# Patient Record
Sex: FEMALE | Race: WHITE
[De-identification: names, ages, dates, MRNs, and addresses within clinical notes are randomized per-mention and may not be internally consistent; named-entity substitution may affect disease eponyms.]

---

## 2020-01-21 ENCOUNTER — HOSPITAL ENCOUNTER (EMERGENCY)
Dept: HOSPITAL 62 - ER | Age: 35
Discharge: HOME | End: 2020-01-21
Payer: COMMERCIAL

## 2020-01-21 VITALS — DIASTOLIC BLOOD PRESSURE: 87 MMHG | SYSTOLIC BLOOD PRESSURE: 127 MMHG

## 2020-01-21 DIAGNOSIS — R07.81: ICD-10-CM

## 2020-01-21 DIAGNOSIS — R07.89: Primary | ICD-10-CM

## 2020-01-21 DIAGNOSIS — R06.02: ICD-10-CM

## 2020-01-21 PROCEDURE — 99283 EMERGENCY DEPT VISIT LOW MDM: CPT

## 2020-01-21 PROCEDURE — 71101 X-RAY EXAM UNILAT RIBS/CHEST: CPT

## 2020-01-21 NOTE — RADIOLOGY REPORT (SQ)
EXAM DESCRIPTION:  RIBS LEFT W/PA CHEST



COMPLETED DATE/TIME:  1/21/2020 2:01 pm



REASON FOR STUDY:  pain to left ribs anterior close to sternum



COMPARISON:  None.



TECHNIQUE:  Frontal view of the chest and additional views of the left ribs acquired.



NUMBER OF VIEWS:  3



LIMITATIONS:  None.



FINDINGS:  FRONTAL CXR: No pneumothorax.  No pleural effusion.  No atelectasis or infiltrates.

RIBS: No displaced rib fractures.  No lytic or blastic bony lesions.

OTHER: No other significant finding.



IMPRESSION:  NO PNEUMOTHORAX.  NO DISPLACED RIB FRACTURES.



COMMENT:  SITE OF TRAUMA/COMPLAINT MARKED/STAMP COMPLETED: NA



TECHNICAL DOCUMENTATION:  JOB ID:  [id number]

 2011 [employer]- All Rights Reserved



Reading location - IP/workstation name: [id number]

## 2020-01-21 NOTE — ER DOCUMENT REPORT
ED General





- General


Chief Complaint: Rib Pain


Stated Complaint: RIB PAIN


Time Seen by Provider: 01/21/20 14:33


Primary Care Provider: 


COURTNEY TAPIA MD [Primary Care Provider] - Follow up in 3-5 days


Mode of Arrival: Ambulatory


Information source: Patient


Notes: 





34-year-old female presents to ED for complaint of left rib pain that started on

Saturday night.  Friday night she had done a stretch with her  

where she reached across and it felt like a rib popped or moved.  She states the

pain is been there since then.  She states the pain is much worse if she tries 

to lay down reach around her or move.  She states Tylenol Motrin and heat help a

little bit but do not relieve the pain.  She states she has been short of breath

and she feels like she needs to clear her throat frequently.


TRAVEL OUTSIDE OF THE [place] IN LAST 30 DAYS: No





- HPI


Onset: Last week


Onset/Duration: Sudden


Quality of pain: Sharp


Severity: Moderate


Pain Level: 3


Associated symptoms: Chest pain - Left chest wall pain


Exacerbated by: Supine, Walking, Coughing, Deep breathing


Relieved by: Denies


Similar symptoms previously: No


Recently seen / treated by doctor: No





Past Medical History





- General


Information source: Patient





- Social History


Smoking Status: Never Smoker


Cigarette use (# per day): No


Chew tobacco use (# tins/day): No


Smoking Education Provided: No


Frequency of alcohol use: None


Drug Abuse: None


Occupation: RN


Lives with: Family


Family History: Reviewed & Not Pertinent


Patient has suicidal ideation: No


Patient has homicidal ideation: No





- Past Medical History


Cardiac Medical History: Reports: None


Pulmonary Medical History: Reports: None


EENT Medical History: Reports: None


Neurological Medical History: Reports: None


Endocrine Medical History: Reports: None


Renal/ Medical History: Reports: Hx Kidney Stones


Malignancy Medical History: Reports: None


GI Medical History: Reports: None


Musculoskeletal Medical History: Reports None


Skin Medical History: Reports None


Psychiatric Medical History: Reports: None


Traumatic Medical History: Reports: None


Infectious Medical History: Reports: None


Past Surgical History: Reports: Hx Appendectomy, Hx Kidney (Renal Surgery) - 

Stents





- Immunizations


Immunizations up to date: Yes


Hx Diphtheria, Pertussis, Tetanus Vaccination: Yes





Review of Systems





- Review of Systems


Constitutional: No symptoms reported


EENT: No symptoms reported


Cardiovascular: Chest pain - Left chest wall pain


Respiratory: No symptoms reported


Gastrointestinal: No symptoms reported


Genitourinary: No symptoms reported


Female Genitourinary: No symptoms reported


Musculoskeletal: Muscle pain - Left chest wall


Skin: No symptoms reported


Hematologic/Lymphatic: No symptoms reported


Neurological/Psychological: No symptoms reported


-: Yes All other systems reviewed and negative





Physical Exam





- Vital signs


Vitals: 


                                        











Temp Pulse Resp BP Pulse Ox


 


 98.2 F   70   20   131/93 H  100 


 


 01/21/20 14:22  01/21/20 14:22  01/21/20 14:22  01/21/20 14:22  01/21/20 14:22











Interpretation: Normal





- General


General appearance: Appears well, Alert





- HEENT


Head: Normocephalic, Atraumatic


Eyes: Normal


Pupils: PERRL





- Respiratory


Respiratory status: No respiratory distress


Chest status: Tender, Pain on movement, Pain with cough, Pain with deep 

breathing


Breath sounds: Normal


Chest palpation: Normal





- Cardiovascular


Rhythm: Regular


Heart sounds: Normal auscultation


Murmur: No





- Abdominal


Inspection: Normal


Distension: No distension


Bowel sounds: Normal


Tenderness: Nontender


Organomegaly: No organomegaly





- Back


Back: Normal, Nontender





- Extremities


General upper extremity: Normal inspection, Nontender, Normal color, Normal ROM,

Normal temperature


General lower extremity: Normal inspection, Nontender, Normal color, Normal ROM,

Normal temperature, Normal weight bearing.  No: Bhavik's sign





- Neurological


Neuro grossly intact: Yes


Cognition: Normal


Orientation: AAOx4


Marshal Coma Scale Eye Opening: Spontaneous


[place] Coma Scale Verbal: Oriented


[place] Coma Scale Motor: Obeys Commands


Marshal Coma Scale Total: 15


Speech: Normal


Motor strength normal: LUE, RUE, LLE, RLE


Sensory: Normal





- Psychological


Associated symptoms: Normal affect, Normal mood





- Skin


Skin Temperature: Warm


Skin Moisture: Dry


Skin Color: Normal





Course





- Re-evaluation


Re-evalutation: 





01/21/20 18:54


X-ray results were discussed with patient.  There was no fractures no 

pneumothorax no crepitus on exam or on x-ray.  Patient was given a written 

report of the x-ray and discharged home after she verbalized understanding and 

agreement with treatment plan.





- Vital Signs


Vital signs: 


                                        











Temp Pulse Resp BP Pulse Ox


 


 97.9 F   68   18   127/87 H  98 


 


 01/21/20 15:27  01/21/20 15:27  01/21/20 15:27  01/21/20 15:27  01/21/20 15:27














- Diagnostic Test


Radiology reviewed: Image reviewed, Reports reviewed





Discharge





- Discharge


Clinical Impression: 


 Chest wall tenderness





Condition: Stable


Disposition: HOME, SELF-CARE


Additional Instructions: 


CHEST WALL PAIN:


     Your chest pain may be coming from the chest wall. This is often caused by 

straining the muscles or joints in the chest during physical activity, direct 

trauma, coughing, or vigorous vomiting.  Persons with arthritis are especially 

prone to this type of pain, due to inflammation of the cartilage joints near the

breast bone. Occasionally, no cause can be found.


     Rest from strenuous physical activity.  This kind of chest pain is usually 

made worse by movement of the chest.  Depending on the symptoms, we may 

prescribe medicine for pain, muscle relaxation, and antiinflammatory effects.


     If the pain is new, and seems to be due to muscle strain, cold packs can 

help.  Otherwise, apply gentle warmth to the painful area for 15 minutes every 

hour or two.


     You should call contact the doctor immediately if things change. Further 

evaluation is needed if you develop a fever or cough, if the nature of the pain 

changes, or if you become short of breath.





Toradol Injection





     You have been given an injection of ketorolac tromethamine (Toradol).  This

is an excellent, safe drug for pain control.  It also has potent 

antiinflammatory action.  You should have significant pain relief within about 

one hour.


     Toradol is not addicting and is non-sedating.  It does not interfere with 

driving or work.


     Call or return if you develop itching, hives, shortness of breath, or rash.





Muscle Relaxers





     Muscle relaxing medications are usually prescribed for acute muscle spasm 

or injury to the neck and back.  They are often combined with antiinflammatory 

pain medication for increased relief.


     You may stop the muscle relaxer when the pain and stiffness have improved. 

Start the medication again if spasms recur.  


     Muscle relaxers may cause drowsiness, especially with the first dose.  Do 

not operate machinery or drive while under the effects of the medication.  Most 

muscle relaxers last up to 24 hours.  Do not combine the medication with 

alcohol.





FOLLOW-UP CARE:


If you have been referred to a physician for follow-up care, call the 

physicians office for an appointment as you were instructed or within the next 

two days.  If you experience worsening or a significant change in your symptoms,

notify the physician immediately or return to the Emergency Department at any 

time for re-evaluation.


Prescriptions: 


Ibuprofen [Motrin 800 mg Tablet] 800 mg PO Q8HP PRN #14 tab


 PRN Reason: 


Metaxalone [Skelaxin] 800 mg PO TIDP PRN #14 tablet


 PRN Reason: 


Forms:  Elevated Blood Pressure


Referrals: 


COURTNEY TAPIA MD [Primary Care Provider] - Follow up in 3-5 days